# Patient Record
Sex: FEMALE | Race: BLACK OR AFRICAN AMERICAN | NOT HISPANIC OR LATINO | Employment: UNEMPLOYED | URBAN - NONMETROPOLITAN AREA
[De-identification: names, ages, dates, MRNs, and addresses within clinical notes are randomized per-mention and may not be internally consistent; named-entity substitution may affect disease eponyms.]

---

## 2023-02-02 ENCOUNTER — OFFICE VISIT (OUTPATIENT)
Dept: FAMILY MEDICINE CLINIC | Facility: CLINIC | Age: 33
End: 2023-02-02
Payer: COMMERCIAL

## 2023-02-02 VITALS
OXYGEN SATURATION: 99 % | DIASTOLIC BLOOD PRESSURE: 70 MMHG | HEIGHT: 61 IN | WEIGHT: 185 LBS | SYSTOLIC BLOOD PRESSURE: 124 MMHG | HEART RATE: 85 BPM | BODY MASS INDEX: 34.93 KG/M2

## 2023-02-02 DIAGNOSIS — L20.82 FLEXURAL ECZEMA: ICD-10-CM

## 2023-02-02 DIAGNOSIS — J30.9 CHRONIC ALLERGIC RHINITIS: ICD-10-CM

## 2023-02-02 DIAGNOSIS — E66.9 OBESITY (BMI 30.0-34.9): ICD-10-CM

## 2023-02-02 DIAGNOSIS — Z00.00 PHYSICAL EXAM: Primary | ICD-10-CM

## 2023-02-02 DIAGNOSIS — R04.0 EPISTAXIS NOT DUE TO TRAUMA: ICD-10-CM

## 2023-02-02 PROBLEM — E66.811 OBESITY (BMI 30.0-34.9): Status: ACTIVE | Noted: 2023-02-02

## 2023-02-02 PROCEDURE — 2014F MENTAL STATUS ASSESS: CPT | Performed by: PHYSICIAN ASSISTANT

## 2023-02-02 PROCEDURE — 99395 PREV VISIT EST AGE 18-39: CPT | Performed by: PHYSICIAN ASSISTANT

## 2023-02-02 PROCEDURE — 3008F BODY MASS INDEX DOCD: CPT | Performed by: PHYSICIAN ASSISTANT

## 2023-02-02 PROCEDURE — 99213 OFFICE O/P EST LOW 20 MIN: CPT | Performed by: PHYSICIAN ASSISTANT

## 2023-02-02 RX ORDER — LORATADINE 10 MG/1
1 CAPSULE, LIQUID FILLED ORAL DAILY
Qty: 90 EACH | Refills: 3 | Status: SHIPPED | OUTPATIENT
Start: 2023-02-02

## 2023-02-02 RX ORDER — LORATADINE 10 MG/1
1 CAPSULE, LIQUID FILLED ORAL DAILY
COMMUNITY
End: 2023-02-02 | Stop reason: SDUPTHER

## 2023-02-02 RX ORDER — CETIRIZINE HYDROCHLORIDE 10 MG/1
1 TABLET ORAL DAILY
COMMUNITY
Start: 2022-11-26 | End: 2023-02-02

## 2023-02-02 RX ORDER — BUPROPION HYDROCHLORIDE 150 MG/1
150 TABLET ORAL DAILY
Qty: 30 TABLET | Refills: 1 | Status: SHIPPED | OUTPATIENT
Start: 2023-02-02 | End: 2023-04-04

## 2023-02-02 RX ORDER — TRIAMCINOLONE ACETONIDE 5 MG/G
1 CREAM TOPICAL 3 TIMES DAILY
COMMUNITY
End: 2023-02-02

## 2023-02-02 RX ORDER — TRIAMCINOLONE ACETONIDE 1 MG/G
1 CREAM TOPICAL 2 TIMES DAILY
Qty: 30 G | Refills: 5 | Status: SHIPPED | OUTPATIENT
Start: 2023-02-02

## 2023-02-04 PROBLEM — R04.0 EPISTAXIS NOT DUE TO TRAUMA: Status: ACTIVE | Noted: 2023-02-04

## 2023-02-04 NOTE — ASSESSMENT & PLAN NOTE
Patient's (Body mass index is 34.96 kg/m².) indicates that they are obese (BMI >30) with health conditions that include none . Weight is newly identified. BMI  is above average; BMI management plan is completed. We discussed portion control, increasing exercise and pharmacologic options including wellbutrin, patient denies any history of seizure or eating disorder. Patient will RTC for follow up in 4-6 weeks

## 2023-02-04 NOTE — PROGRESS NOTES
"Chief Complaint  Annual Exam, Headache, and Nose bleeds    Augustine Banuelos presents to [facility]  History of Present Illness  Patient reports today for physical exam and will RTC for fasting blood work.      Patient reports having seasonal allergies and eczema.  ptient reports she has been taking allergy medication daily secondary to recent flare up.  States she has scheduled an appointment with ENT secondary to having chronic nasal congestion and recent nose bleeds.  Reports eczema is improving with treatment.    Patient reports she has been working out and still not losing weight.  Would like to discuss assistance with weight lose.      Objective   Vital Signs:   /70 (BP Location: Left arm, Patient Position: Sitting, Cuff Size: Large Adult)   Pulse 85   Ht 154.9 cm (61\")   Wt 83.9 kg (185 lb)   SpO2 99%   BMI 34.96 kg/m²     Body mass index is 34.96 kg/m².    Review of Systems    Health Maintenance   Topic Date Due   • TDAP/TD VACCINES (1 - Tdap) Never done   • COVID-19 Vaccine (3 - Booster for Moderna series) 09/08/2021   • INFLUENZA VACCINE  Never done   • HEPATITIS C SCREENING  Never done   • ANNUAL PHYSICAL  Never done   • PAP SMEAR  Never done   • Pneumococcal Vaccine 0-64  Aged Out        Past History:  Medical History: has a past medical history of Anxiety, arthritis rheumatoid, Atopic dermatitis, Fracture, Irregular periods, Seborrheic dermatitis of scalp, and Skin disorder.   Surgical History: has no past surgical history on file.   Family History: family history includes Allergies in an other family member; Asthma in her father; Cancer in an other family member; Diabetes in an other family member; Eczema in an other family member.   Social History: reports that she has never smoked. She has never used smokeless tobacco. She reports current alcohol use. She reports that she does not use drugs.      Current Outpatient Medications:   •  Loratadine " 10 MG capsule, Take 1 capsule by mouth Daily. For allergies, Disp: 90 each, Rfl: 3  •  mupirocin (BACTROBAN) 2 % ointment, Apply 1 application topically to the appropriate area as directed 3 (Three) Times a Day., Disp: , Rfl:   •  buPROPion XL (Wellbutrin XL) 150 MG 24 hr tablet, Take 1 tablet by mouth Daily. For appetite, Disp: 30 tablet, Rfl: 1  •  triamcinolone (KENALOG) 0.1 % cream, Apply 1 application topically to the appropriate area as directed 2 (Two) Times a Day., Disp: 30 g, Rfl: 5    Allergies: Patient has no known allergies.    Physical Exam  Vitals and nursing note reviewed.   Constitutional:       General: She is not in acute distress.     Appearance: She is obese. She is not ill-appearing.   HENT:      Head: Normocephalic.      Right Ear: Tympanic membrane, ear canal and external ear normal.      Left Ear: Tympanic membrane, ear canal and external ear normal.      Nose: Nose normal.      Comments: Edematous turbinates on right     Mouth/Throat:      Mouth: Mucous membranes are moist.   Eyes:      Pupils: Pupils are equal, round, and reactive to light.   Cardiovascular:      Rate and Rhythm: Normal rate and regular rhythm.      Pulses: Normal pulses.   Pulmonary:      Effort: Pulmonary effort is normal. No respiratory distress.   Abdominal:      General: Bowel sounds are normal.      Palpations: Abdomen is soft.      Tenderness: There is no abdominal tenderness. There is no guarding or rebound.   Musculoskeletal:         General: Normal range of motion.      Cervical back: Normal range of motion.   Skin:     General: Skin is warm and dry.      Capillary Refill: Capillary refill takes less than 2 seconds.   Neurological:      General: No focal deficit present.      Mental Status: She is oriented to person, place, and time.   Psychiatric:         Mood and Affect: Mood normal.          Result Review :                   Assessment and Plan    Diagnoses and all orders for this visit:    1. Physical exam  (Primary)  Assessment & Plan:  Discussed injury prevention, diet and exercise, safe sexual practices, and screening for common diseases. Encouraged use of sunscreen and seatbelts. Encouraged SBE, avoidance of tobacco, limiting alcohol, and yearly dental and eye exams.       Orders:  -     CBC & Differential; Future  -     Comprehensive Metabolic Panel; Future  -     TSH; Future  -     Lipid Panel; Future    2. Chronic allergic rhinitis  Assessment & Plan:  Refilled patients meds    Orders:  -     Loratadine 10 MG capsule; Take 1 capsule by mouth Daily. For allergies  Dispense: 90 each; Refill: 3    3. Flexural eczema  Assessment & Plan:  Refilled patients medication    Orders:  -     triamcinolone (KENALOG) 0.1 % cream; Apply 1 application topically to the appropriate area as directed 2 (Two) Times a Day.  Dispense: 30 g; Refill: 5    4. Obesity (BMI 30.0-34.9)  Assessment & Plan:  Patient's (Body mass index is 34.96 kg/m².) indicates that they are obese (BMI >30) with health conditions that include none . Weight is newly identified. BMI  is above average; BMI management plan is completed. We discussed portion control, increasing exercise and pharmacologic options including wellbutrin, patient denies any history of seizure or eating disorder.     Orders:  -     buPROPion XL (Wellbutrin XL) 150 MG 24 hr tablet; Take 1 tablet by mouth Daily. For appetite  Dispense: 30 tablet; Refill: 1    5. Epistaxis not due to trauma  Assessment & Plan:  Advised patient this is likely due to dryness,recommended nasal saline.  She has an upcoming appointment with ENT        Follow Up   No follow-ups on file.  Patient was given instructions and counseling regarding her condition or for health maintenance advice. Please see specific information pulled into the AVS if appropriate.     Alma Kidd PA-C

## 2023-02-04 NOTE — ASSESSMENT & PLAN NOTE
Advised patient this is likely due to dryness,recommended nasal saline.  She has an upcoming appointment with ENT

## 2023-02-06 RX ORDER — OMEPRAZOLE 20 MG/1
CAPSULE, DELAYED RELEASE ORAL
Qty: 90 CAPSULE | Refills: 3 | Status: SHIPPED | OUTPATIENT
Start: 2023-02-06

## 2023-02-08 LAB
ALBUMIN SERPL-MCNC: 4.6 G/DL (ref 3.8–4.8)
ALBUMIN/GLOB SERPL: 1.9 {RATIO} (ref 1.2–2.2)
ALP SERPL-CCNC: 77 IU/L (ref 44–121)
ALT SERPL-CCNC: 31 IU/L (ref 0–32)
AST SERPL-CCNC: 28 IU/L (ref 0–40)
BASOPHILS # BLD AUTO: 0.1 X10E3/UL (ref 0–0.2)
BASOPHILS NFR BLD AUTO: 1 %
BILIRUB SERPL-MCNC: 0.3 MG/DL (ref 0–1.2)
BUN SERPL-MCNC: 9 MG/DL (ref 6–20)
BUN/CREAT SERPL: 11 (ref 9–23)
CALCIUM SERPL-MCNC: 9.4 MG/DL (ref 8.7–10.2)
CHLORIDE SERPL-SCNC: 99 MMOL/L (ref 96–106)
CHOLEST SERPL-MCNC: 204 MG/DL (ref 100–199)
CO2 SERPL-SCNC: 26 MMOL/L (ref 20–29)
CREAT SERPL-MCNC: 0.82 MG/DL (ref 0.57–1)
EGFRCR SERPLBLD CKD-EPI 2021: 97 ML/MIN/1.73
EOSINOPHIL # BLD AUTO: 0.2 X10E3/UL (ref 0–0.4)
EOSINOPHIL NFR BLD AUTO: 2 %
ERYTHROCYTE [DISTWIDTH] IN BLOOD BY AUTOMATED COUNT: 14 % (ref 11.7–15.4)
GLOBULIN SER CALC-MCNC: 2.4 G/DL (ref 1.5–4.5)
GLUCOSE SERPL-MCNC: 91 MG/DL (ref 70–99)
HCT VFR BLD AUTO: 43.3 % (ref 34–46.6)
HDLC SERPL-MCNC: 53 MG/DL
HGB BLD-MCNC: 14.5 G/DL (ref 11.1–15.9)
IMM GRANULOCYTES # BLD AUTO: 0 X10E3/UL (ref 0–0.1)
IMM GRANULOCYTES NFR BLD AUTO: 0 %
LDLC SERPL CALC-MCNC: 139 MG/DL (ref 0–99)
LYMPHOCYTES # BLD AUTO: 2.9 X10E3/UL (ref 0.7–3.1)
LYMPHOCYTES NFR BLD AUTO: 36 %
MCH RBC QN AUTO: 26.8 PG (ref 26.6–33)
MCHC RBC AUTO-ENTMCNC: 33.5 G/DL (ref 31.5–35.7)
MCV RBC AUTO: 80 FL (ref 79–97)
MONOCYTES # BLD AUTO: 0.5 X10E3/UL (ref 0.1–0.9)
MONOCYTES NFR BLD AUTO: 6 %
NEUTROPHILS # BLD AUTO: 4.5 X10E3/UL (ref 1.4–7)
NEUTROPHILS NFR BLD AUTO: 55 %
PLATELET # BLD AUTO: 338 X10E3/UL (ref 150–450)
POTASSIUM SERPL-SCNC: 4.1 MMOL/L (ref 3.5–5.2)
PROT SERPL-MCNC: 7 G/DL (ref 6–8.5)
RBC # BLD AUTO: 5.42 X10E6/UL (ref 3.77–5.28)
SODIUM SERPL-SCNC: 139 MMOL/L (ref 134–144)
TRIGL SERPL-MCNC: 67 MG/DL (ref 0–149)
TSH SERPL DL<=0.005 MIU/L-ACNC: 1.35 UIU/ML (ref 0.45–4.5)
VLDLC SERPL CALC-MCNC: 12 MG/DL (ref 5–40)
WBC # BLD AUTO: 8.1 X10E3/UL (ref 3.4–10.8)

## 2023-04-04 DIAGNOSIS — E66.9 OBESITY (BMI 30.0-34.9): ICD-10-CM

## 2023-04-04 RX ORDER — BUPROPION HYDROCHLORIDE 150 MG/1
150 TABLET ORAL DAILY
Qty: 30 TABLET | Refills: 1 | Status: SHIPPED | OUTPATIENT
Start: 2023-04-04

## 2023-05-15 ENCOUNTER — TELEMEDICINE (OUTPATIENT)
Dept: FAMILY MEDICINE CLINIC | Facility: CLINIC | Age: 33
End: 2023-05-15
Payer: COMMERCIAL

## 2023-05-15 ENCOUNTER — TELEPHONE (OUTPATIENT)
Dept: FAMILY MEDICINE CLINIC | Facility: CLINIC | Age: 33
End: 2023-05-15

## 2023-05-15 VITALS — WEIGHT: 178 LBS | HEIGHT: 61 IN | BODY MASS INDEX: 33.61 KG/M2
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
## 2023-05-15 DIAGNOSIS — M54.2 CERVICALGIA: ICD-10-CM

## 2023-05-15 DIAGNOSIS — J45.20 MILD INTERMITTENT ASTHMA WITHOUT COMPLICATION: ICD-10-CM

## 2023-05-15 DIAGNOSIS — M54.16 ACUTE LEFT LUMBAR RADICULOPATHY: Primary | ICD-10-CM

## 2023-05-15 RX ORDER — ALBUTEROL SULFATE 90 UG/1
2 AEROSOL, METERED RESPIRATORY (INHALATION) EVERY 4 HOURS PRN
Qty: 8 G | Refills: 5 | Status: SHIPPED | OUTPATIENT
Start: 2023-05-15

## 2023-05-15 RX ORDER — NAPROXEN 500 MG/1
500 TABLET ORAL 2 TIMES DAILY WITH MEALS
Qty: 60 TABLET | Refills: 3 | Status: SHIPPED | OUTPATIENT
Start: 2023-05-15

## 2023-05-15 RX ORDER — METHYLPREDNISOLONE 4 MG/1
TABLET ORAL
Qty: 19 TABLET | Refills: 0 | Status: SHIPPED | OUTPATIENT
Start: 2023-05-15 | End: 2023-05-25

## 2023-05-15 RX ORDER — ORPHENADRINE CITRATE 100 MG/1
100 TABLET, EXTENDED RELEASE ORAL 2 TIMES DAILY
Qty: 60 TABLET | Refills: 3 | Status: SHIPPED | OUTPATIENT
Start: 2023-05-15
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
## 2023-05-16 PROBLEM — M54.2 CERVICALGIA: Status: ACTIVE | Noted: 2023-05-16

## 2023-05-16 NOTE — ASSESSMENT & PLAN NOTE
Renewed patient's medication from ER and started on a steroid taper.  Advised patient to continue follow-up with chiropractor along with stretching and heat.  Patient knowledge understanding.

## 2023-08-22 DIAGNOSIS — E66.9 OBESITY (BMI 30.0-34.9): ICD-10-CM

## 2023-08-22 RX ORDER — BUPROPION HYDROCHLORIDE 150 MG/1
150 TABLET ORAL DAILY
Qty: 30 TABLET | Refills: 1 | Status: SHIPPED | OUTPATIENT
Start: 2023-08-22

## 2023-09-25 DIAGNOSIS — M54.16 ACUTE LEFT LUMBAR RADICULOPATHY: ICD-10-CM

## 2023-09-25 RX ORDER — NAPROXEN 500 MG/1
500 TABLET ORAL 2 TIMES DAILY WITH MEALS
Qty: 60 TABLET | Refills: 3 | Status: SHIPPED | OUTPATIENT
Start: 2023-09-25

## 2023-10-23 DIAGNOSIS — M54.16 ACUTE LEFT LUMBAR RADICULOPATHY: ICD-10-CM

## 2023-10-23 RX ORDER — ORPHENADRINE CITRATE 100 MG/1
100 TABLET, EXTENDED RELEASE ORAL 2 TIMES DAILY
Qty: 60 TABLET | Refills: 3 | Status: SHIPPED | OUTPATIENT
Start: 2023-10-23

## 2023-11-21 DIAGNOSIS — E66.9 OBESITY (BMI 30.0-34.9): ICD-10-CM

## 2024-01-24 DIAGNOSIS — E66.9 OBESITY (BMI 30.0-34.9): ICD-10-CM

## 2024-01-24 DIAGNOSIS — J30.9 CHRONIC ALLERGIC RHINITIS: ICD-10-CM

## 2024-01-24 DIAGNOSIS — L20.82 FLEXURAL ECZEMA: ICD-10-CM

## 2024-01-24 RX ORDER — BUPROPION HYDROCHLORIDE 150 MG/1
150 TABLET ORAL DAILY
Qty: 30 TABLET | Refills: 1 | OUTPATIENT
Start: 2024-01-24

## 2024-01-24 NOTE — TELEPHONE ENCOUNTER
Caller: Savi Banuelos    Relationship: Self    Best call back number: [phone]     Requested Prescriptions:   Requested Prescriptions     Pending Prescriptions Disp Refills    buPROPion XL (WELLBUTRIN XL) 150 MG 24 hr tablet 30 tablet 1     Sig: Take 1 tablet by mouth Daily. For appetite    triamcinolone (KENALOG) 0.1 % cream 30 g 5     Sig: Apply 1 application  topically to the appropriate area as directed 2 (Two) Times a Day.    Loratadine 10 MG capsule 90 each 3     Sig: Take 1 capsule by mouth Daily. For allergies        Pharmacy where request should be sent: [facility]/PHARMACY #[id number] - [facility] [address] - [phone]  - [phone] FX     Last office visit with prescribing clinician: 2/2/2023   Last telemedicine visit with prescribing clinician: Visit date not found   Next office visit with prescribing clinician: 3/8/2024     Additional details provided by patient: PATIENT IS COMPLETELY OUT OF THEIR BUPROPION AND LORATADINE        Does the patient have less than a 3 day supply:  [x] Yes  [] No    Would you like a call back once the refill request has been completed: [] Yes [x] No    If the office needs to give you a call back, can they leave a voicemail: [] Yes [x] No    Jonny Woodall Rep   01/24/24 12:29 EST

## 2024-01-25 RX ORDER — TRIAMCINOLONE ACETONIDE 1 MG/G
1 CREAM TOPICAL 2 TIMES DAILY
Qty: 30 G | Refills: 5 | Status: SHIPPED | OUTPATIENT
Start: 2024-01-25

## 2024-01-25 RX ORDER — BUPROPION HYDROCHLORIDE 150 MG/1
150 TABLET ORAL DAILY
Qty: 30 TABLET | Refills: 1 | Status: SHIPPED | OUTPATIENT
Start: 2024-01-25

## 2024-01-25 RX ORDER — LORATADINE 10 MG/1
1 CAPSULE, LIQUID FILLED ORAL DAILY
Qty: 90 EACH | Refills: 3 | Status: SHIPPED | OUTPATIENT
Start: 2024-01-25

## 2024-02-23 DIAGNOSIS — M54.16 ACUTE LEFT LUMBAR RADICULOPATHY: ICD-10-CM

## 2024-03-01 RX ORDER — NAPROXEN 500 MG/1
500 TABLET ORAL 2 TIMES DAILY WITH MEALS
Qty: 60 TABLET | Refills: 3 | Status: SHIPPED | OUTPATIENT
Start: 2024-03-01

## 2024-03-06 DIAGNOSIS — E66.9 OBESITY (BMI 30.0-34.9): ICD-10-CM

## 2024-03-06 RX ORDER — BUPROPION HYDROCHLORIDE 150 MG/1
150 TABLET ORAL DAILY
Qty: 30 TABLET | Refills: 1 | Status: SHIPPED | OUTPATIENT
Start: 2024-03-06 | End: 2024-03-08 | Stop reason: SDUPTHER

## 2024-03-08 ENCOUNTER — OFFICE VISIT (OUTPATIENT)
Dept: FAMILY MEDICINE CLINIC | Facility: CLINIC | Age: 34
End: 2024-03-08
Payer: COMMERCIAL

## 2024-03-08 VITALS
HEIGHT: 61 IN | SYSTOLIC BLOOD PRESSURE: 120 MMHG | HEART RATE: 95 BPM | OXYGEN SATURATION: 100 % | WEIGHT: 166 LBS | BODY MASS INDEX: 31.34 KG/M2 | DIASTOLIC BLOOD PRESSURE: 86 MMHG

## 2024-03-08 DIAGNOSIS — M54.16 CHRONIC LEFT-SIDED LUMBAR RADICULOPATHY: ICD-10-CM

## 2024-03-08 DIAGNOSIS — E66.01 MORBID (SEVERE) OBESITY DUE TO EXCESS CALORIES: ICD-10-CM

## 2024-03-08 DIAGNOSIS — Z13.1 SCREENING FOR DIABETES MELLITUS: ICD-10-CM

## 2024-03-08 DIAGNOSIS — E66.9 OBESITY (BMI 30.0-34.9): ICD-10-CM

## 2024-03-08 DIAGNOSIS — N91.2 AMENORRHEA: ICD-10-CM

## 2024-03-08 DIAGNOSIS — Z00.00 PHYSICAL EXAM: Primary | ICD-10-CM

## 2024-03-08 DIAGNOSIS — J30.9 CHRONIC ALLERGIC RHINITIS: ICD-10-CM

## 2024-03-08 DIAGNOSIS — J45.20 MILD INTERMITTENT ASTHMA WITHOUT COMPLICATION: ICD-10-CM

## 2024-03-08 DIAGNOSIS — L20.82 FLEXURAL ECZEMA: ICD-10-CM

## 2024-03-08 LAB
B-HCG UR QL: NEGATIVE
EXPIRATION DATE: ABNORMAL
INTERNAL NEGATIVE CONTROL: ABNORMAL
INTERNAL POSITIVE CONTROL: NEGATIVE
Lab: ABNORMAL

## 2024-03-08 PROCEDURE — 36415 COLL VENOUS BLD VENIPUNCTURE: CPT | Performed by: PHYSICIAN ASSISTANT

## 2024-03-08 RX ORDER — CHOLECALCIFEROL (VITAMIN D3) 25 MCG
TABLET,CHEWABLE ORAL
COMMUNITY

## 2024-03-08 RX ORDER — ALBUTEROL SULFATE 90 UG/1
2 AEROSOL, METERED RESPIRATORY (INHALATION) EVERY 4 HOURS PRN
Qty: 8 G | Refills: 5 | Status: SHIPPED | OUTPATIENT
Start: 2024-03-08

## 2024-03-08 RX ORDER — LORATADINE 10 MG/1
1 CAPSULE, LIQUID FILLED ORAL DAILY
Qty: 90 EACH | Refills: 3 | Status: SHIPPED | OUTPATIENT
Start: 2024-03-08

## 2024-03-08 RX ORDER — BUPROPION HYDROCHLORIDE 150 MG/1
150 TABLET ORAL DAILY
Qty: 90 TABLET | Refills: 1 | Status: SHIPPED | OUTPATIENT
Start: 2024-03-08

## 2024-03-08 RX ORDER — TRIAMCINOLONE ACETONIDE 1 MG/G
1 CREAM TOPICAL 2 TIMES DAILY
Qty: 453.6 G | Refills: 5 | Status: SHIPPED | OUTPATIENT
Start: 2024-03-08

## 2024-03-08 NOTE — PROGRESS NOTES
"Chief Complaint  Leg Pain (Has been going on for around a year ) and Annual Exam    Subjective          Savi Banuelos presents to [facility]  History of Present Illness  Patient reports today for physical exam and fasting blood work.       Patient reports having seasonal allergies and eczema.  Patient reports she has been taking allergy medication daily secondary to recent flare up.  States she continues to use triamcinolone cream PRN for eczema.       Patient reports she has been working out and losing weight.  Reports wellbutrin helps with her cravings and she would like to continue.      Patient reports for the past year she has been having lower back pain that goes down her leg.  States the pain comes and goes.  Reports she would like more investigation into the matter    Patient reports she and her  have been attempting to conceive for the past 8-10 months with no success.  State she is taking prenatal vitamins.  Patient reports she is a month late with her cycle however negative pregnancy test at home.  States she continues follow up with GYN and her  plans to follow up with Urology        Objective   Vital Signs:   /86   Pulse 95   Ht 154.9 cm (61\")   Wt 75.3 kg (166 lb)   SpO2 100%   BMI 31.37 kg/m²     Body mass index is 31.37 kg/m².    Review of Systems    Health Maintenance   Topic Date Due    HEPATITIS C SCREENING  Never done    COVID-19 Vaccine (3 - 2023-24 season) 09/01/2023    ANNUAL PHYSICAL  02/02/2024    INFLUENZA VACCINE  03/31/2024 (Originally 8/1/2023)    Pneumococcal Vaccine 0-64 (1 of 2 - PCV) 03/08/2025 (Originally 9/18/1995)    BMI FOLLOWUP  03/08/2025    PAP SMEAR  08/01/2026    TDAP/TD VACCINES (2 - Td or Tdap) 01/01/2029        Past History:  Medical History: has a past medical history of Allergic (Toddler age), Anxiety, arthritis rheumatoid, Atopic dermatitis, Fracture, Irregular periods, Seborrheic dermatitis of scalp, and Skin " disorder.   Surgical History: has no past surgical history on file.   Family History: family history includes Allergies in an other family member; Asthma in her father and paternal aunt; Cancer in her maternal grandmother and another family member; Diabetes in her paternal grandmother and another family member; Drug abuse in her maternal grandmother; Early death in her maternal grandfather and maternal grandmother; Eczema in an other family member; Hyperlipidemia in her paternal grandmother.   Social History: reports that she has never smoked. She has never used smokeless tobacco. She reports current alcohol use of about 2.0 standard drinks of alcohol per week. She reports that she does not use drugs.      Current Outpatient Medications:     albuterol sulfate  (90 Base) MCG/ACT inhaler, Inhale 2 puffs Every 4 (Four) Hours As Needed for Wheezing or Shortness of Air., Disp: 8 g, Rfl: 5    buPROPion XL (WELLBUTRIN XL) 150 MG 24 hr tablet, Take 1 tablet by mouth Daily. For appetite, Disp: 90 tablet, Rfl: 1    Loratadine 10 MG capsule, Take 1 capsule by mouth Daily. For allergies, Disp: 90 each, Rfl: 3    mupirocin (BACTROBAN) 2 % ointment, Apply 1 Application topically to the appropriate area as directed 3 (Three) Times a Day., Disp: , Rfl:     orphenadrine (NORFLEX) 100 MG 12 hr tablet, TAKE 1 TABLET BY MOUTH 2 (TWO) TIMES A DAY. AS NEEDED FOR MUSCLE RELAXATION, CAUTION SEDATION, Disp: 60 tablet, Rfl: 3    Prenatal Vit-Fe Sulfate-FA-DHA (Prenatal Vitamin/Min +DHA) 27-0.8-200 MG capsule, , Disp: , Rfl:     triamcinolone (KENALOG) 0.1 % cream, Apply 1 Application topically to the appropriate area as directed 2 (Two) Times a Day., Disp: 453.6 g, Rfl: 5    Allergies: Patient has no known allergies.    Physical Exam  Vitals and nursing note reviewed.   Constitutional:       General: She is not in acute distress.     Appearance: She is obese. She is not ill-appearing.   HENT:      Head: Normocephalic.      Right Ear:  Tympanic membrane, ear canal and external ear normal.      Left Ear: Tympanic membrane, ear canal and external ear normal.      Nose: Nose normal.      Mouth/Throat:      Mouth: Mucous membranes are moist.   Eyes:      Pupils: Pupils are equal, round, and reactive to light.   Cardiovascular:      Rate and Rhythm: Normal rate and regular rhythm.      Pulses: Normal pulses.   Pulmonary:      Effort: Pulmonary effort is normal. No respiratory distress.   Abdominal:      General: Bowel sounds are normal.      Palpations: Abdomen is soft.      Tenderness: There is no abdominal tenderness. There is no guarding or rebound.   Musculoskeletal:         General: Normal range of motion.      Cervical back: Normal range of motion.   Skin:     General: Skin is warm and dry.      Capillary Refill: Capillary refill takes less than 2 seconds.   Neurological:      General: No focal deficit present.      Mental Status: She is oriented to person, place, and time.   Psychiatric:         Mood and Affect: Mood normal.          Result Review :                   Assessment and Plan    Diagnoses and all orders for this visit:    1. Physical exam (Primary)  Assessment & Plan:  Discussed injury prevention, diet and exercise, safe sexual practices, and screening for common diseases. Encouraged use of sunscreen and seatbelts. Encouraged SBE, avoidance of tobacco, limiting alcohol, and yearly dental and eye exams.       Orders:  -     CBC & Differential; Future  -     Comprehensive Metabolic Panel; Future  -     TSH; Future  -     Lipid Panel; Future  -     CBC & Differential  -     Comprehensive Metabolic Panel  -     TSH  -     Lipid Panel  -     POCT pregnancy, urine    2. Screening for diabetes mellitus  Assessment & Plan:  Ha1c drawn this date    Orders:  -     Hemoglobin A1c; Future  -     Hemoglobin A1c    3. Morbid (severe) obesity due to excess calories  Assessment & Plan:  Patient's (Body mass index is 31.37 kg/m².) indicates that they  are obese (BMI >30) with health conditions that include none . Weight is improving with treatment. BMI  is above average; BMI management plan is completed. We discussed low calorie, low carb based diet program, portion control, increasing exercise, and pharmacologic options including wellbutrin .       4. Mild intermittent asthma without complication  Assessment & Plan:  Asthma is stable.  The patient is experiencing no daytime asthma symptoms and she is experiencing no nighttime asthma symptoms.    Plan: Continue same medication/s without change.    Discussed medication dosage, use, side effects, and goals of treatment in detail.    Discussed distinction between quick-relief and maintenance control medications.  Discussed monitoring symptoms and use of quick-relief medications and contacting provider early in the course of exacerbations.  Warning signs of respiratory distress were reviewed with the patient.     Patient Treatment Goals: symptom prevention, minimizing limitation in activity, prevention of exacerbations and use of ER/inpatient care, maintenance of optimal pulmonary function, and minimization of adverse effects of treatment.    Followup in 6 months.            Orders:  -     albuterol sulfate  (90 Base) MCG/ACT inhaler; Inhale 2 puffs Every 4 (Four) Hours As Needed for Wheezing or Shortness of Air.  Dispense: 8 g; Refill: 5    5. Obesity (BMI 30.0-34.9)  -     buPROPion XL (WELLBUTRIN XL) 150 MG 24 hr tablet; Take 1 tablet by mouth Daily. For appetite  Dispense: 90 tablet; Refill: 1    6. Chronic allergic rhinitis  Assessment & Plan:  Continue current treatment plan, meds refilled    Orders:  -     Loratadine 10 MG capsule; Take 1 capsule by mouth Daily. For allergies  Dispense: 90 each; Refill: 3    7. Flexural eczema  Assessment & Plan:  Improving patient will continue PRN triamcinolone cream    Orders:  -     triamcinolone (KENALOG) 0.1 % cream; Apply 1 Application topically to the  appropriate area as directed 2 (Two) Times a Day.  Dispense: 453.6 g; Refill: 5    8. Chronic left-sided lumbar radiculopathy  Assessment & Plan:  Advised patient she is having radicular pain will get xray of lumbar spine today.  Recommended she continue stretching, ice and heat.  OTC meds as needed for pain. Contact office if she is interested in PT.      Orders:  -     XR Spine Lumbar 2 or 3 View; Future    9. Amenorrhea  Assessment & Plan:  Negative pregnancy test this date.  She will continue follow up with GYN          Follow Up   Return in about 6 months (around 9/8/2024) for Recheck.  Patient was given instructions and counseling regarding her condition or for health maintenance advice. Please see specific information pulled into the AVS if appropriate.     Alma Kidd PA-C

## 2024-03-09 LAB
ALBUMIN SERPL-MCNC: 5 G/DL (ref 3.9–4.9)
ALBUMIN/GLOB SERPL: 1.9 {RATIO} (ref 1.2–2.2)
ALP SERPL-CCNC: 88 IU/L (ref 44–121)
ALT SERPL-CCNC: 14 IU/L (ref 0–32)
AST SERPL-CCNC: 19 IU/L (ref 0–40)
BASOPHILS # BLD AUTO: 0.1 X10E3/UL (ref 0–0.2)
BASOPHILS NFR BLD AUTO: 1 %
BILIRUB SERPL-MCNC: 0.2 MG/DL (ref 0–1.2)
BUN SERPL-MCNC: 11 MG/DL (ref 6–20)
BUN/CREAT SERPL: 14 (ref 9–23)
CALCIUM SERPL-MCNC: 10 MG/DL (ref 8.7–10.2)
CHLORIDE SERPL-SCNC: 100 MMOL/L (ref 96–106)
CHOLEST SERPL-MCNC: 221 MG/DL (ref 100–199)
CO2 SERPL-SCNC: 25 MMOL/L (ref 20–29)
CREAT SERPL-MCNC: 0.8 MG/DL (ref 0.57–1)
EGFRCR SERPLBLD CKD-EPI 2021: 99 ML/MIN/1.73
EOSINOPHIL # BLD AUTO: 0.1 X10E3/UL (ref 0–0.4)
EOSINOPHIL NFR BLD AUTO: 1 %
ERYTHROCYTE [DISTWIDTH] IN BLOOD BY AUTOMATED COUNT: 14.1 % (ref 11.7–15.4)
GLOBULIN SER CALC-MCNC: 2.7 G/DL (ref 1.5–4.5)
GLUCOSE SERPL-MCNC: 77 MG/DL (ref 70–99)
HBA1C MFR BLD: 5.5 % (ref 4.8–5.6)
HCT VFR BLD AUTO: 45.7 % (ref 34–46.6)
HDLC SERPL-MCNC: 80 MG/DL
HGB BLD-MCNC: 14.8 G/DL (ref 11.1–15.9)
IMM GRANULOCYTES # BLD AUTO: 0 X10E3/UL (ref 0–0.1)
IMM GRANULOCYTES NFR BLD AUTO: 0 %
LDLC SERPL CALC-MCNC: 132 MG/DL (ref 0–99)
LYMPHOCYTES # BLD AUTO: 2.8 X10E3/UL (ref 0.7–3.1)
LYMPHOCYTES NFR BLD AUTO: 30 %
MCH RBC QN AUTO: 27.1 PG (ref 26.6–33)
MCHC RBC AUTO-ENTMCNC: 32.4 G/DL (ref 31.5–35.7)
MCV RBC AUTO: 84 FL (ref 79–97)
MONOCYTES # BLD AUTO: 0.5 X10E3/UL (ref 0.1–0.9)
MONOCYTES NFR BLD AUTO: 5 %
NEUTROPHILS # BLD AUTO: 6 X10E3/UL (ref 1.4–7)
NEUTROPHILS NFR BLD AUTO: 63 %
PLATELET # BLD AUTO: 335 X10E3/UL (ref 150–450)
POTASSIUM SERPL-SCNC: 4.2 MMOL/L (ref 3.5–5.2)
PROT SERPL-MCNC: 7.7 G/DL (ref 6–8.5)
RBC # BLD AUTO: 5.46 X10E6/UL (ref 3.77–5.28)
SODIUM SERPL-SCNC: 139 MMOL/L (ref 134–144)
TRIGL SERPL-MCNC: 51 MG/DL (ref 0–149)
TSH SERPL DL<=0.005 MIU/L-ACNC: 1.29 UIU/ML (ref 0.45–4.5)
VLDLC SERPL CALC-MCNC: 9 MG/DL (ref 5–40)
WBC # BLD AUTO: 9.5 X10E3/UL (ref 3.4–10.8)

## 2024-03-15 NOTE — ASSESSMENT & PLAN NOTE
Patient's (Body mass index is 31.37 kg/m².) indicates that they are obese (BMI >30) with health conditions that include none . Weight is improving with treatment. BMI  is above average; BMI management plan is completed. We discussed low calorie, low carb based diet program, portion control, increasing exercise, and pharmacologic options including wellbutrin .

## 2024-03-15 NOTE — ASSESSMENT & PLAN NOTE
Asthma is stable.  The patient is experiencing no daytime asthma symptoms and she is experiencing no nighttime asthma symptoms.    Plan: Continue same medication/s without change.    Discussed medication dosage, use, side effects, and goals of treatment in detail.    Discussed distinction between quick-relief and maintenance control medications.  Discussed monitoring symptoms and use of quick-relief medications and contacting provider early in the course of exacerbations.  Warning signs of respiratory distress were reviewed with the patient.     Patient Treatment Goals: symptom prevention, minimizing limitation in activity, prevention of exacerbations and use of ER/inpatient care, maintenance of optimal pulmonary function, and minimization of adverse effects of treatment.    Followup in 6 months.

## 2024-03-15 NOTE — ASSESSMENT & PLAN NOTE
Advised patient she is having radicular pain will get xray of lumbar spine today.  Recommended she continue stretching, ice and heat.  OTC meds as needed for pain. Contact office if she is interested in PT.

## 2024-10-30 DIAGNOSIS — E66.811 OBESITY (BMI 30.0-34.9): ICD-10-CM

## 2024-10-30 RX ORDER — BUPROPION HYDROCHLORIDE 150 MG/1
150 TABLET ORAL DAILY
Qty: 90 TABLET | Refills: 1 | Status: SHIPPED | OUTPATIENT
Start: 2024-10-30

## 2024-12-11 ENCOUNTER — OFFICE VISIT (OUTPATIENT)
Dept: FAMILY MEDICINE CLINIC | Facility: CLINIC | Age: 34
End: 2024-12-11
Payer: COMMERCIAL

## 2024-12-11 VITALS
HEIGHT: 61 IN | BODY MASS INDEX: 34.27 KG/M2 | OXYGEN SATURATION: 98 % | DIASTOLIC BLOOD PRESSURE: 80 MMHG | WEIGHT: 181.5 LBS | HEART RATE: 78 BPM | SYSTOLIC BLOOD PRESSURE: 126 MMHG

## 2024-12-11 DIAGNOSIS — K59.09 CHRONIC CONSTIPATION: ICD-10-CM

## 2024-12-11 DIAGNOSIS — M54.16 CHRONIC LEFT-SIDED LUMBAR RADICULOPATHY: Primary | ICD-10-CM

## 2024-12-11 PROCEDURE — 99214 OFFICE O/P EST MOD 30 MIN: CPT | Performed by: PHYSICIAN ASSISTANT

## 2024-12-11 PROCEDURE — 1159F MED LIST DOCD IN RCRD: CPT | Performed by: PHYSICIAN ASSISTANT

## 2024-12-11 PROCEDURE — 1160F RVW MEDS BY RX/DR IN RCRD: CPT | Performed by: PHYSICIAN ASSISTANT

## 2024-12-11 RX ORDER — POLYETHYLENE GLYCOL 3350 17 G/17G
17 POWDER, FOR SOLUTION ORAL DAILY
Qty: 5 EACH | Refills: 0 | COMMUNITY
Start: 2024-12-11 | End: 2024-12-11

## 2024-12-11 RX ORDER — POLYETHYLENE GLYCOL 3350 17 G/17G
17 POWDER, FOR SOLUTION ORAL DAILY
Qty: 100 EACH | Refills: 5 | Status: SHIPPED | OUTPATIENT
Start: 2024-12-11

## 2024-12-11 RX ORDER — ESTRADIOL 1 MG/1
1 TABLET ORAL DAILY
COMMUNITY

## 2024-12-11 RX ORDER — PROGESTERONE 100 MG/1
100 CAPSULE ORAL DAILY
COMMUNITY

## 2024-12-11 NOTE — ASSESSMENT & PLAN NOTE
Will get KUB this date.  Advised patient she is likely having tenderness to her lower abdomen secondary to constipation.  Provided patient with samples of MiraLAX and prescribed medication for her to start using daily.  Provided her with referral to gastroenterologist

## 2024-12-11 NOTE — ASSESSMENT & PLAN NOTE
Advised patient she continues to have radicular pain.  Provided her with referral to physical therapy this date. Recommended she continue stretching, ice and heat.  OTC meds as needed for pain. Contact office if she is interested in PT.

## 2024-12-11 NOTE — PROGRESS NOTES
"Chief Complaint  Constipation and Back Pain (Ongoing for over a year)    Augustine Banuelos presents to [facility]  History of Present Illness  Patient reports today to discuss a few issues.  Patient states she continues to have low back pain that is going down the left side of her hip and leg.  Patient reports she has been using stretching, massage, ice and heat.  Reports no relief.    Patient states she continues to have issues with constipation.  States she did not start MiraLAX daily as discussed last visit.  Reports she continues to have bowel movements about every 2 days and states it takes a long time for hard and stools to pass.  Recently took a 14-day cleansing pill.  Reports it did help her have daily bowel movements however it caused some tenderness to her lower abdomen.  Patient denies any nausea or vomiting, denies any diarrhea.  Would like to follow-up with gastroenterologist.  Constipation  Associated symptoms include back pain.   Back Pain        Objective   Vital Signs:  /80 (BP Location: Left arm, Patient Position: Sitting, Cuff Size: Adult)   Pulse 78   Ht 154.9 cm (61\")   Wt 82.3 kg (181 lb 8 oz)   SpO2 98%   BMI 34.29 kg/m²   Estimated body mass index is 34.29 kg/m² as calculated from the following:    Height as of this encounter: 154.9 cm (61\").    Weight as of this encounter: 82.3 kg (181 lb 8 oz).            Physical Exam  Vitals and nursing note reviewed.   Constitutional:       General: She is not in acute distress.     Appearance: She is not ill-appearing.   Cardiovascular:      Rate and Rhythm: Normal rate and regular rhythm.      Pulses: Normal pulses.   Pulmonary:      Effort: Pulmonary effort is normal. No respiratory distress.   Abdominal:      General: Bowel sounds are normal. There is no distension.      Palpations: Abdomen is soft.      Tenderness: There is abdominal tenderness. There is no guarding or rebound.      Comments: " Mild bilateral hypogastric tenderness   Musculoskeletal:         General: Tenderness present. Normal range of motion.      Comments: Patient is tender upon palpation to left lumbar paraspinous muscles.  Patient is non-tender upon palpation to cervical, thoracic and lumbar spine.     Skin:     General: Skin is warm and dry.   Neurological:      Gait: Gait normal.   Psychiatric:         Mood and Affect: Mood normal.        Result Review :                Assessment and Plan   Diagnoses and all orders for this visit:    1. Chronic left-sided lumbar radiculopathy (Primary)  Assessment & Plan:  Advised patient she continues to have radicular pain.  Provided her with referral to physical therapy this date. Recommended she continue stretching, ice and heat.  OTC meds as needed for pain. Contact office if she is interested in PT.      Orders:  -     Ambulatory Referral to Physical Therapy for Evaluation & Treatment    2. Chronic constipation  Assessment & Plan:  Will get KUB this date.  Advised patient she is likely having tenderness to her lower abdomen secondary to constipation.  Provided patient with samples of MiraLAX and prescribed medication for her to start using daily.  Provided her with referral to gastroenterologist    Orders:  -     Ambulatory Referral to Gastroenterology  -     Discontinue: polyethylene glycol (MIRALAX) 17 g packet; Take 17 g by mouth Daily. For constipation  Dispense: 5 each; Refill: 0  -     polyethylene glycol (MIRALAX) 17 g packet; Take 17 g by mouth Daily. For constipation  Dispense: 100 each; Refill: 5  -     XR Abdomen KUB; Future             Follow Up   Return if symptoms worsen or fail to improve.  Patient was given instructions and counseling regarding her condition or for health maintenance advice. Please see specific information pulled into the AVS if appropriate.

## 2025-01-30 DIAGNOSIS — K59.09 CHRONIC CONSTIPATION: Primary | ICD-10-CM

## 2025-01-30 DIAGNOSIS — L20.82 FLEXURAL ECZEMA: ICD-10-CM

## 2025-04-02 DIAGNOSIS — J30.9 CHRONIC ALLERGIC RHINITIS: ICD-10-CM

## 2025-04-03 RX ORDER — LORATADINE 10 MG/1
TABLET ORAL
Qty: 30 TABLET | Refills: 11 | Status: SHIPPED | OUTPATIENT
Start: 2025-04-03

## 2025-04-14 ENCOUNTER — OFFICE VISIT (OUTPATIENT)
Dept: GASTROENTEROLOGY | Facility: CLINIC | Age: 35
End: 2025-04-14
Payer: COMMERCIAL

## 2025-04-14 VITALS
SYSTOLIC BLOOD PRESSURE: 138 MMHG | WEIGHT: 185.4 LBS | DIASTOLIC BLOOD PRESSURE: 98 MMHG | BODY MASS INDEX: 35.01 KG/M2 | HEART RATE: 97 BPM | OXYGEN SATURATION: 98 % | HEIGHT: 61 IN

## 2025-04-14 DIAGNOSIS — K59.00 CONSTIPATION, UNSPECIFIED CONSTIPATION TYPE: Primary | ICD-10-CM

## 2025-04-14 DIAGNOSIS — K21.9 GASTROESOPHAGEAL REFLUX DISEASE, UNSPECIFIED WHETHER ESOPHAGITIS PRESENT: ICD-10-CM

## 2025-04-14 NOTE — PROGRESS NOTES
New Patient Consultation     Patient Name: Savi Banuelos  : 1990   MRN: [id number]     Chief Complaint:    Chief Complaint   Patient presents with    Constipation       History of Present Illness: Savi Banuelos is a 34 y.o. female, PMH includes anxiety, who is here today for a Gastroenterology Consultation for constipation.     Pt notes onset of constipation about one year ago. She previously had a complete, formed BM every 1-2 days. She has been undergoing fertility treatments and doing various rounds of Clomid, estradiol and progesterone during this time.     She established care with GI provider at [facility], who recommended she take Miralax 2-3 times daily and proceed with CSY, but their office has now closed.    Pt is taking Miralax BID and generally has one or more soft, mostly complete BM per day. She has also completed colon cleanse via [facility]. She reports balanced diet, adequate oral hydration and fiber intake. She endorses mild LLQ abdominal discomfort.     Pt also reports mild intermittent GERD. She is requesting EGD at time of any planned procedures.     Patient denies associated fever, chills, nausea, vomiting, hematemesis, dysphagia, hematochezia, melena, unintentional weight loss or gain, dysuria, jaundice or bruising.    Patient denies personal or FHx of PUD, H Pylori, gastritis, pancreatitis, colitis, Celiac disease, UC, Crohn's disease, IBS, colon or gastric cancers. Pt denies EtOH, tobacco, illicit substance or NSAID use. No previous abdominal surgeries.     No previous EGD / CSY.    Subjective      Review of Systems:   Review of Systems   Constitutional:  Negative for appetite change, chills, diaphoresis, fatigue, fever, unexpected weight gain and unexpected weight loss.   HENT:  Negative for drooling, facial swelling, mouth sores, rhinorrhea, sore throat, tinnitus, trouble swallowing and voice change.    Eyes: Negative.    Respiratory:  Negative for cough, chest tightness and shortness of  breath.    Cardiovascular:  Negative for chest pain.   Gastrointestinal:  Positive for abdominal pain (LLQ), constipation and GERD. Negative for blood in stool, diarrhea, nausea, vomiting and indigestion.   Genitourinary:  Negative for dysuria and hematuria.   Skin:  Negative for color change, pallor and rash.   All other systems reviewed and are negative.      Past Medical History:   Past Medical History:   Diagnosis Date    Allergic Toddler age    Anxiety     arthritis rheumatoid     Atopic dermatitis     Fracture     Irregular periods     Seborrheic dermatitis of scalp     Skin disorder        Past Surgical History: History reviewed. No pertinent surgical history.    Family History:   Family History   Problem Relation Age of Onset    Asthma Father     Allergies Other     Cancer Other     Eczema Other     Diabetes Other     Early death Maternal Grandfather         Construction accident    Cancer Maternal Grandmother         Esophageal cancer    Drug abuse Maternal Grandmother     Early death Maternal Grandmother     Diabetes Paternal Grandmother     Hyperlipidemia Paternal Grandmother     Asthma Paternal Aunt        Social History:   Social History     Socioeconomic History    Marital status:    Tobacco Use    Smoking status: Never    Smokeless tobacco: Never   Vaping Use    Vaping status: Never Used   Substance and Sexual Activity    Alcohol use: Yes     Alcohol/week: 2.0 standard drinks of alcohol     Types: 1 Glasses of wine, 1 Shots of liquor per week    Drug use: Never    Sexual activity: Yes     Partners: Male     Birth control/protection: None       Alcohol/Tobacco History:   Social History     Substance and Sexual Activity   Alcohol Use Yes    Alcohol/week: 2.0 standard drinks of alcohol    Types: 1 Glasses of wine, 1 Shots of liquor per week     Social History     Tobacco Use   Smoking Status Never   Smokeless Tobacco Never       Medications:     Current Outpatient Medications:     albuterol  "sulfate  (90 Base) MCG/ACT inhaler, Inhale 2 puffs Every 4 (Four) Hours As Needed for Wheezing or Shortness of Air., Disp: 8 g, Rfl: 5    buPROPion XL (WELLBUTRIN XL) 150 MG 24 hr tablet, TAKE 1 TABLET BY MOUTH DAILY. FOR APPETITE, Disp: 90 tablet, Rfl: 1    estradiol (ESTRACE) 1 MG tablet, Take 1 tablet by mouth Daily., Disp: , Rfl:     loratadine (CLARITIN) 10 MG tablet, TAKE 1 TABLET BY MOUTH DAILY. FOR ALLERGIES, Disp: 30 tablet, Rfl: 11    mupirocin (BACTROBAN) 2 % ointment, Apply 1 Application topically to the appropriate area as directed 3 (Three) Times a Day., Disp: , Rfl:     polyethylene glycol (MIRALAX) 17 g packet, Take 17 g by mouth Daily. For constipation, Disp: 100 each, Rfl: 5    Prenatal Vit-Fe Sulfate-FA-DHA (Prenatal Vitamin/Min +DHA) 27-0.8-200 MG capsule, , Disp: , Rfl:     Progesterone (PROMETRIUM) 100 MG capsule, Take 1 capsule by mouth Daily., Disp: , Rfl:     triamcinolone (KENALOG) 0.1 % cream, Apply 1 Application topically to the appropriate area as directed 2 (Two) Times a Day., Disp: 453.6 g, Rfl: 5    orphenadrine (NORFLEX) 100 MG 12 hr tablet, TAKE 1 TABLET BY MOUTH 2 (TWO) TIMES A DAY. AS NEEDED FOR MUSCLE RELAXATION, CAUTION SEDATION (Patient not taking: Reported on 4/14/2025), Disp: 60 tablet, Rfl: 3    Allergies:   No Known Allergies    Objective     Physical Exam:  Vital Signs:   Vitals:    04/14/25 1304   BP: 138/98   BP Location: Left arm   Patient Position: Sitting   Cuff Size: Adult   Pulse: 97   SpO2: 98%   Weight: 84.1 kg (185 lb 6.4 oz)   Height: 154.9 cm (61\")     Body mass index is 35.03 kg/m².     Physical Exam  Vitals and nursing note reviewed.   Constitutional:       Appearance: Normal appearance. She is normal weight. She is not ill-appearing or diaphoretic.      Comments: BMI 35.03   HENT:      Head: Normocephalic and atraumatic.      Right Ear: External ear normal.      Left Ear: External ear normal.      Nose: Nose normal.      Mouth/Throat:      Mouth: " Mucous membranes are moist.      Pharynx: Oropharynx is clear.   Eyes:      Conjunctiva/sclera: Conjunctivae normal.      Pupils: Pupils are equal, round, and reactive to light.   Neck:      Thyroid: No thyromegaly.   Cardiovascular:      Rate and Rhythm: Normal rate and regular rhythm.      Pulses: Normal pulses.      Heart sounds: Normal heart sounds.   Pulmonary:      Effort: Pulmonary effort is normal.      Breath sounds: Normal breath sounds.   Abdominal:      General: Abdomen is flat. Bowel sounds are normal. There is no distension.      Tenderness: There is abdominal tenderness (mild, bilateral lower quadrants).   Genitourinary:     Comments: Deferred  Musculoskeletal:         General: Normal range of motion.      Cervical back: Normal range of motion and neck supple.   Skin:     General: Skin is warm and dry.   Neurological:      General: No focal deficit present.      Mental Status: She is oriented to person, place, and time.   Psychiatric:         Mood and Affect: Mood normal.         Assessment / Plan      Assessment/Plan:   There are no diagnoses linked to this encounter.     Constipation  GERD   - continue Miralax 17g BID   - pt encouraged to continue adequate oral hydration, fiber intake, daily activity level as tolerated   - obtain CBC, CMP, CRP, Mg, TSH, vit D, celiac panel    - schedule for EGD/ CSY   - follow up in clinic after completion of above studies   - call clinic at any time for questions or new / worsened sx    Follow Up:   Return for Next scheduled follow up.    Plan of care reviewed with the patient at the conclusion of today's visit.  Education was provided regarding diagnosis, management, and any prescribed or recommended OTC medications.  Patient verbalized understanding of and agreement with management plan.     NOTE TO PATIENT: The 21st Century Cures Act makes medical notes like these available to patients in the interest of transparency. However, be advised this is a medical  document. It is intended as peer to peer communication. It is written in medical language and may contain abbreviations or verbiage that are unfamiliar. It may appear blunt or direct. Medical documents are intended to carry relevant information, facts as evident, and the clinical opinion of the practitioner.     Time Statement:   Discussed plan of care in detail with patient today. Patient verbally understands and agrees. I have spent 45 minutes reviewing available diagnostics, obtaining history, examining the patient, developing a treatment plan, and educating the patient on disease process and plan of care.    Danyelle Petty PA-C   [facility]

## 2025-04-16 ENCOUNTER — PATIENT ROUNDING (BHMG ONLY) (OUTPATIENT)
Dept: GASTROENTEROLOGY | Facility: CLINIC | Age: 35
End: 2025-04-16
Payer: COMMERCIAL

## 2025-04-16 NOTE — PROGRESS NOTES
[employer] MESSAGE HAS BEEN SENT TO THE PATIENT FOR PATIENT ROUNDING WITH [facility]

## 2025-04-30 ENCOUNTER — RESULTS FOLLOW-UP (OUTPATIENT)
Dept: GASTROENTEROLOGY | Facility: CLINIC | Age: 35
End: 2025-04-30
Payer: COMMERCIAL

## 2025-04-30 LAB
25(OH)D3+25(OH)D2 SERPL-MCNC: 40.2 NG/ML (ref 30–100)
ALBUMIN SERPL-MCNC: 4.4 G/DL (ref 3.9–4.9)
ALP SERPL-CCNC: 70 IU/L (ref 44–121)
ALT SERPL-CCNC: 19 IU/L (ref 0–32)
AST SERPL-CCNC: 24 IU/L (ref 0–40)
BILIRUB SERPL-MCNC: <0.2 MG/DL (ref 0–1.2)
BUN SERPL-MCNC: 11 MG/DL (ref 6–20)
BUN/CREAT SERPL: 14 (ref 9–23)
CALCIUM SERPL-MCNC: 9.3 MG/DL (ref 8.7–10.2)
CHLORIDE SERPL-SCNC: 103 MMOL/L (ref 96–106)
CO2 SERPL-SCNC: 25 MMOL/L (ref 20–29)
CREAT SERPL-MCNC: 0.81 MG/DL (ref 0.57–1)
EGFRCR SERPLBLD CKD-EPI 2021: 97 ML/MIN/1.73
ERYTHROCYTE [DISTWIDTH] IN BLOOD BY AUTOMATED COUNT: 13.4 % (ref 11.7–15.4)
GLOBULIN SER CALC-MCNC: 2.1 G/DL (ref 1.5–4.5)
GLUCOSE SERPL-MCNC: 90 MG/DL (ref 70–99)
HCT VFR BLD AUTO: 42.6 % (ref 34–46.6)
HGB BLD-MCNC: 13.7 G/DL (ref 11.1–15.9)
MAGNESIUM SERPL-MCNC: 2.1 MG/DL (ref 1.6–2.3)
MCH RBC QN AUTO: 27.3 PG (ref 26.6–33)
MCHC RBC AUTO-ENTMCNC: 32.2 G/DL (ref 31.5–35.7)
MCV RBC AUTO: 85 FL (ref 79–97)
PLATELET # BLD AUTO: 315 X10E3/UL (ref 150–450)
POTASSIUM SERPL-SCNC: 4.1 MMOL/L (ref 3.5–5.2)
PROT SERPL-MCNC: 6.5 G/DL (ref 6–8.5)
RBC # BLD AUTO: 5.02 X10E6/UL (ref 3.77–5.28)
SODIUM SERPL-SCNC: 141 MMOL/L (ref 134–144)
TSH SERPL DL<=0.005 MIU/L-ACNC: 1.07 UIU/ML (ref 0.45–4.5)
WBC # BLD AUTO: 6.2 X10E3/UL (ref 3.4–10.8)

## 2025-04-30 NOTE — LETTER
Savi Banuelos  [address] [facility] [address]    April 30, 2025     Dear Ms. Banuelos:    Below are the results from your recent visit:    Resulted Orders   CBC (No Diff)   Result Value Ref Range    WBC 6.2 3.4 - 10.8 x10E3/uL    RBC 5.02 3.77 - 5.28 x10E6/uL    Hemoglobin 13.7 11.1 - 15.9 g/dL    Hematocrit 42.6 34.0 - 46.6 %    MCV 85 79 - 97 fL    MCH 27.3 26.6 - 33.0 pg    MCHC 32.2 31.5 - 35.7 g/dL    RDW 13.4 11.7 - 15.4 %    Platelets 315 150 - 450 x10E3/uL   Comprehensive Metabolic Panel   Result Value Ref Range    Glucose 90 70 - 99 mg/dL    BUN 11 6 - 20 mg/dL    Creatinine 0.81 0.57 - 1.00 mg/dL    EGFR Result 97 >59 mL/min/1.73    BUN/Creatinine Ratio 14 9 - 23    Sodium 141 134 - 144 mmol/L    Potassium 4.1 3.5 - 5.2 mmol/L    Chloride 103 96 - 106 mmol/L    Total CO2 25 20 - 29 mmol/L    Calcium 9.3 8.7 - 10.2 mg/dL    Total Protein 6.5 6.0 - 8.5 g/dL    Albumin 4.4 3.9 - 4.9 g/dL    Globulin 2.1 1.5 - 4.5 g/dL    Total Bilirubin <0.2 0.0 - 1.2 mg/dL    Alkaline Phosphatase 70 44 - 121 IU/L    AST (SGOT) 24 0 - 40 IU/L    ALT (SGPT) 19 0 - 32 IU/L   Magnesium   Result Value Ref Range    Magnesium 2.1 1.6 - 2.3 mg/dL   TSH   Result Value Ref Range    TSH 1.070 0.450 - 4.500 uIU/mL   Vitamin D 25 Hydroxy   Result Value Ref Range    25 Hydroxy, Vitamin D 40.2 30.0 - 100.0 ng/mL      Comment:      Vitamin D deficiency has been defined by the [place] of  Medicine and an Endocrine Society practice guideline as a  level of serum 25-OH vitamin D less than 20 ng/mL (1,2).  The Endocrine Society went on to further define vitamin D  insufficiency as a level between 21 and 29 ng/mL (2).  1. IOM ([place] of Medicine). 2010. Dietary reference     intakes for calcium and D. [place]: The     National Academies Press.  2. Ana Paula MF, Pooja SARKAR, Stephen SAUCEDO, et al.     Evaluation, treatment, and prevention of vitamin D     deficiency: an Endocrine Society clinical practice     guideline. JCEM. 2011 Jul;  96(3):2887-10.        CBC, CMP, Mg, TSH, vit D are within normal limits.               If you have any questions or concerns, please don't hesitate to call.         Sincerely,        Danyelle Petty PA-C

## 2025-04-30 NOTE — TELEPHONE ENCOUNTER
Called and spoke with pt and advised her of results, pt agrees and understands. Pt does not have any questions and or concerns at this moment.

## 2025-05-05 PROCEDURE — 88305 TISSUE EXAM BY PATHOLOGIST: CPT | Performed by: INTERNAL MEDICINE

## 2025-05-12 RX ORDER — SODIUM, POTASSIUM,MAG SULFATES 17.5-3.13G
1 SOLUTION, RECONSTITUTED, ORAL ORAL TAKE AS DIRECTED
Qty: 354 ML | Refills: 0 | Status: SHIPPED | OUTPATIENT
Start: 2025-05-12

## 2025-05-12 NOTE — TELEPHONE ENCOUNTER
I spoke with Ms . Informed her that bowel prep has been sent to pharmacy. Also went over bowel prep instructions with patient for two day prep.

## 2025-05-14 ENCOUNTER — OUTSIDE FACILITY SERVICE (OUTPATIENT)
Dept: GASTROENTEROLOGY | Facility: CLINIC | Age: 35
End: 2025-05-14
Payer: COMMERCIAL

## 2025-05-14 PROCEDURE — 43239 EGD BIOPSY SINGLE/MULTIPLE: CPT | Performed by: INTERNAL MEDICINE

## 2025-05-14 PROCEDURE — 88305 TISSUE EXAM BY PATHOLOGIST: CPT | Performed by: INTERNAL MEDICINE

## 2025-05-14 PROCEDURE — 45380 COLONOSCOPY AND BIOPSY: CPT | Performed by: INTERNAL MEDICINE

## 2025-05-15 ENCOUNTER — LAB REQUISITION (OUTPATIENT)
Dept: LAB | Facility: HOSPITAL | Age: 35
End: 2025-05-15
Payer: COMMERCIAL

## 2025-05-15 DIAGNOSIS — R10.30 LOWER ABDOMINAL PAIN, UNSPECIFIED: ICD-10-CM

## 2025-05-15 DIAGNOSIS — K21.00 GASTRO-ESOPHAGEAL REFLUX DISEASE WITH ESOPHAGITIS, WITHOUT BLEEDING: ICD-10-CM

## 2025-05-15 DIAGNOSIS — K21.9 GASTRO-ESOPHAGEAL REFLUX DISEASE WITHOUT ESOPHAGITIS: ICD-10-CM

## 2025-05-15 DIAGNOSIS — K64.8 OTHER HEMORRHOIDS: ICD-10-CM

## 2025-05-15 DIAGNOSIS — K59.00 CONSTIPATION, UNSPECIFIED: ICD-10-CM

## 2025-05-16 ENCOUNTER — RESULTS FOLLOW-UP (OUTPATIENT)
Dept: LAB | Facility: HOSPITAL | Age: 35
End: 2025-05-16
Payer: COMMERCIAL

## 2025-05-16 LAB
CYTO UR: NORMAL
LAB AP CASE REPORT: NORMAL
LAB AP CLINICAL INFORMATION: NORMAL
PATH REPORT.FINAL DX SPEC: NORMAL
PATH REPORT.GROSS SPEC: NORMAL

## 2025-05-16 NOTE — LETTER
May 19, 2025     Savi Banuelos  [address]  [place] [address]      Dear Ms. Banuelos:    Below are the results from your recent visit:    Resulted Orders   Tissue Pathology Exam   Result Value Ref Range    Case Report       Surgical Pathology Report                         Case: [id number]                                  Authorizing Provider:  Ricardo Dimas MD    Collected:           05/14/2025 02:00 PM          Ordering Location:     [facility]   Received:            05/15/2025 10:44 AM                                 LABORATORY                                                                   Pathologist:           Chet Lara MD                                                        Specimens:   1) - Esophagus, Distal                                                                              2) - Esophagus, Mid                                                                                 3) - Ileum, terminal                                                                                4) - Large Intestine, Right / Ascending Colon                                                       5) - Large Intestine, Transverse Colon                                                               6) - Large Intestine, Left / Descending Colon                                                       7) - Large Intestine, Rectum                                                               Clinical Information       Gastro-esophageal reflux disease with esophagitis, without bleeding  Gastro-esophageal reflux disease without esophagitis  Constipation, unspecified  Lower abdominal pain, unspecified  Other hemorrhoids      Final Diagnosis       1.  DISTAL ESOPHAGUS BIOPSY:  Squamous mucosa with minimal basal layer hyperplasia and slight increase in intraepithelial leukocytes without eosinophils.  Minimal benign superficial glandular mucosa.  Negative for intestinal metaplasia and dysplasia.  Histologic  "changes present are mild and felt to be nonspecific.    2.  MIDESOPHAGUS BIOPSY:  Squamous mucosa with no significant histopathologic change.  Negative for intestinal metaplasia, dysplasia, intraepithelial eosinophils and glandular mucosa.    3.  TERMINAL ILEUM BIOPSY:  Normal villous architecture with no significant histopathologic change.  No parasites noted.  No villous blunting or increased intraepithelial lymphocytes suggestive of celiac disease.    4.  ASCENDING COLON BIOPSY:  Colonic mucosa with no significant histopathologic change.  No histologic features of acute colitis, chronic colitis, collagenous colitis or lymphocytic colitis identified.    5.  TRANSVERSE COLON BIOPSY:  Colonic mucosa with no significant histopathologic change.  No histologic features of acute colitis, chronic colitis, collagenous colitis or lymphocytic colitis identified.    6.  DESCENDING COLON BIOPSY:  Colonic mucosa with no significant histopathologic change.  No histologic features of acute colitis, chronic colitis, collagenous colitis or lymphocytic colitis identified.    7.  RECTUM BIOPSY:  Rectal mucosa with no significant histopathologic change.  No histologic features of acute or chronic proctitis identified.      Gross Description       1. Esophagus, Distal.  Received in formalin labeled \"distal esophagus\" is a 1 x 0.2 x 0.1 cm aggregate of multiple white tissue fragments, submitted entirely in a single cassette.  LDP    2. Esophagus, Mid.  Received in formalin labeled \"esophagus; mid esophagus\" is a 0.5 x 0.2 x 0.1 cm aggregate of 2 white tissue fragments, submitted entirely in a single cassette.  LDP    3. Ileum, terminal.  Received in formalin labeled \"terminal ileum\" is a 0.5 x 0.3 x 0.2 cm aggregate of 2 tan tissue fragments, submitted entirely in a single cassette.  LDP    4. Large Intestine, Right / Ascending Colon.  Received in formalin labeled \"ascending colon\" is a 0.5 x 0.3 x 0.1 cm aggregate of 2 tan tissue " "fragments, submitted entirely in a single cassette.  LDP    5. Large Intestine, Transverse Colon.  Received in formalin labeled \"transverse colon\" is a 0.5 x 0.3 x 0.1 cm aggregate of 2 tan tissue fragments, submitted entirely in a single cassette.  LDP    6. Large Intestine, Left / Descending Colon.  Received in formalin labeled \"descending colon\" is a 0.5 x 0.2 x 0.1 cm aggregate of 2 tan tissue fragments, submitted entirely in a single cassette.  LDP    7. Large Intestine, Rectum.  Received in formalin labeled \"rectum\" is a 1 x 0.1 x 0.1 cm aggregate of 2 tan tissue fragments, submitted entirely in a single cassette.  LDP        Microscopic Description       The slides are reviewed and demonstrate histopathologic features supporting the above rendered diagnosis.             Per Dr. Dimas:  The colon biopsies were all normal. If she is experienced issues with constipation may consider anorectal manometry. She did have some mild reflux changes without evidence of Sanders's esophagus.     If you have any questions or concerns, please don't hesitate to call.         Sincerely,        Ricardo Dimas MD  "

## 2025-05-16 NOTE — PROGRESS NOTES
Per Dr. Dimas:  The colon biopsies were all normal. If she is experienced issues with constipation may consider anorectal manometry. She did have some mild reflux changes without evidence of Sanders's esophagus.